# Patient Record
Sex: MALE | Race: WHITE | Employment: FULL TIME | URBAN - METROPOLITAN AREA
[De-identification: names, ages, dates, MRNs, and addresses within clinical notes are randomized per-mention and may not be internally consistent; named-entity substitution may affect disease eponyms.]

---
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
## 2020-03-05 ENCOUNTER — APPOINTMENT (OUTPATIENT)
Dept: GENERAL RADIOLOGY | Age: 20
End: 2020-03-05
Attending: EMERGENCY MEDICINE
Payer: OTHER GOVERNMENT

## 2020-03-05 ENCOUNTER — HOSPITAL ENCOUNTER (EMERGENCY)
Age: 20
Discharge: HOME | End: 2020-03-05
Attending: EMERGENCY MEDICINE
Payer: OTHER GOVERNMENT

## 2020-03-05 VITALS
TEMPERATURE: 97.9 F | HEIGHT: 72 IN | RESPIRATION RATE: 18 BRPM | OXYGEN SATURATION: 97 % | WEIGHT: 150 LBS | HEART RATE: 77 BPM | DIASTOLIC BLOOD PRESSURE: 64 MMHG | SYSTOLIC BLOOD PRESSURE: 129 MMHG | BODY MASS INDEX: 20.32 KG/M2

## 2020-03-05 DIAGNOSIS — T14.91XA SUICIDAL BEHAVIOR WITH ATTEMPTED SELF-INJURY (HCC): ICD-10-CM

## 2020-03-05 DIAGNOSIS — T78.2XXA ANAPHYLAXIS, INITIAL ENCOUNTER: Primary | ICD-10-CM

## 2020-03-05 LAB
ALBUMIN SERPL-MCNC: 3.9 G/DL (ref 3.4–5)
ALBUMIN/GLOB SERPL: 1.6 {RATIO} (ref 0.8–1.7)
ALP SERPL-CCNC: 80 U/L (ref 45–117)
ALT SERPL-CCNC: 37 U/L (ref 16–61)
AMPHET UR QL SCN: NEGATIVE
ANION GAP SERPL CALC-SCNC: 5 MMOL/L (ref 3–18)
AST SERPL-CCNC: 19 U/L (ref 10–38)
BARBITURATES UR QL SCN: NEGATIVE
BASOPHILS # BLD: 0 K/UL (ref 0–0.06)
BASOPHILS NFR BLD: 0 % (ref 0–3)
BENZODIAZ UR QL: NEGATIVE
BILIRUB SERPL-MCNC: 0.6 MG/DL (ref 0.2–1)
BUN SERPL-MCNC: 14 MG/DL (ref 7–18)
BUN/CREAT SERPL: 15 (ref 12–20)
CALCIUM SERPL-MCNC: 8.3 MG/DL (ref 8.5–10.1)
CANNABINOIDS UR QL SCN: POSITIVE
CHLORIDE SERPL-SCNC: 108 MMOL/L (ref 100–111)
CO2 SERPL-SCNC: 29 MMOL/L (ref 21–32)
COCAINE UR QL SCN: NEGATIVE
CREAT SERPL-MCNC: 0.96 MG/DL (ref 0.6–1.3)
DIFFERENTIAL METHOD BLD: ABNORMAL
EOSINOPHIL # BLD: 0 K/UL (ref 0–0.4)
EOSINOPHIL NFR BLD: 0 % (ref 0–5)
ERYTHROCYTE [DISTWIDTH] IN BLOOD BY AUTOMATED COUNT: 12.6 % (ref 11.6–14.5)
ETHANOL SERPL-MCNC: <3 MG/DL (ref 0–3)
GLOBULIN SER CALC-MCNC: 2.4 G/DL (ref 2–4)
GLUCOSE SERPL-MCNC: 153 MG/DL (ref 74–99)
HCT VFR BLD AUTO: 44.3 % (ref 36–48)
HDSCOM,HDSCOM: ABNORMAL
HGB BLD-MCNC: 15.1 G/DL (ref 13–16)
LYMPHOCYTES # BLD: 4.2 K/UL (ref 0.8–3.5)
LYMPHOCYTES NFR BLD: 17 % (ref 20–51)
MCH RBC QN AUTO: 28.8 PG (ref 24–34)
MCHC RBC AUTO-ENTMCNC: 34.1 G/DL (ref 31–37)
MCV RBC AUTO: 84.5 FL (ref 74–97)
METHADONE UR QL: NEGATIVE
MONOCYTES # BLD: 0.2 K/UL (ref 0–1)
MONOCYTES NFR BLD: 1 % (ref 2–9)
NEUTS SEG # BLD: 20.1 K/UL (ref 1.8–8)
NEUTS SEG NFR BLD: 82 % (ref 42–75)
OPIATES UR QL: NEGATIVE
PCP UR QL: NEGATIVE
PLATELET # BLD AUTO: 340 K/UL (ref 135–420)
PLATELET COMMENTS,PCOM: ABNORMAL
PMV BLD AUTO: 9.9 FL (ref 9.2–11.8)
POTASSIUM SERPL-SCNC: 3.2 MMOL/L (ref 3.5–5.5)
PROT SERPL-MCNC: 6.3 G/DL (ref 6.4–8.2)
RBC # BLD AUTO: 5.24 M/UL (ref 4.7–5.5)
RBC MORPH BLD: ABNORMAL
SODIUM SERPL-SCNC: 142 MMOL/L (ref 136–145)
WBC # BLD AUTO: 24.5 K/UL (ref 4.6–13.2)

## 2020-03-05 PROCEDURE — 85025 COMPLETE CBC W/AUTO DIFF WBC: CPT

## 2020-03-05 PROCEDURE — 96374 THER/PROPH/DIAG INJ IV PUSH: CPT

## 2020-03-05 PROCEDURE — 80053 COMPREHEN METABOLIC PANEL: CPT

## 2020-03-05 PROCEDURE — 74011250636 HC RX REV CODE- 250/636: Performed by: EMERGENCY MEDICINE

## 2020-03-05 PROCEDURE — 80307 DRUG TEST PRSMV CHEM ANLYZR: CPT

## 2020-03-05 PROCEDURE — 71045 X-RAY EXAM CHEST 1 VIEW: CPT

## 2020-03-05 PROCEDURE — 74011250636 HC RX REV CODE- 250/636

## 2020-03-05 PROCEDURE — 99285 EMERGENCY DEPT VISIT HI MDM: CPT

## 2020-03-05 PROCEDURE — 96375 TX/PRO/DX INJ NEW DRUG ADDON: CPT

## 2020-03-05 RX ORDER — PREDNISONE 50 MG/1
50 TABLET ORAL DAILY
Qty: 3 TAB | Refills: 0 | Status: SHIPPED | OUTPATIENT
Start: 2020-03-05 | End: 2020-03-08

## 2020-03-05 RX ORDER — DIPHENHYDRAMINE HYDROCHLORIDE 50 MG/ML
50 INJECTION, SOLUTION INTRAMUSCULAR; INTRAVENOUS ONCE
Status: COMPLETED | OUTPATIENT
Start: 2020-03-05 | End: 2020-03-05

## 2020-03-05 RX ORDER — EPINEPHRINE 1 MG/ML
0.3 INJECTION, SOLUTION, CONCENTRATE INTRAVENOUS ONCE
Status: COMPLETED | OUTPATIENT
Start: 2020-03-05 | End: 2020-03-05

## 2020-03-05 RX ORDER — FAMOTIDINE 10 MG/ML
20 INJECTION INTRAVENOUS
Status: COMPLETED | OUTPATIENT
Start: 2020-03-05 | End: 2020-03-05

## 2020-03-05 RX ORDER — DIPHENHYDRAMINE HYDROCHLORIDE 50 MG/ML
INJECTION, SOLUTION INTRAMUSCULAR; INTRAVENOUS
Status: COMPLETED
Start: 2020-03-05 | End: 2020-03-05

## 2020-03-05 RX ORDER — EPINEPHRINE 0.3 MG/.3ML
0.3 INJECTION SUBCUTANEOUS
Qty: 1 SYRINGE | Refills: 5 | Status: SHIPPED | OUTPATIENT
Start: 2020-03-05 | End: 2020-03-05

## 2020-03-05 RX ORDER — EPINEPHRINE 1 MG/ML
INJECTION, SOLUTION, CONCENTRATE INTRAVENOUS
Status: COMPLETED
Start: 2020-03-05 | End: 2020-03-05

## 2020-03-05 RX ORDER — ONDANSETRON 2 MG/ML
INJECTION INTRAMUSCULAR; INTRAVENOUS
Status: COMPLETED
Start: 2020-03-05 | End: 2020-03-05

## 2020-03-05 RX ORDER — ONDANSETRON 2 MG/ML
4 INJECTION INTRAMUSCULAR; INTRAVENOUS
Status: COMPLETED | OUTPATIENT
Start: 2020-03-05 | End: 2020-03-05

## 2020-03-05 RX ADMIN — ONDANSETRON 4 MG: 2 INJECTION INTRAMUSCULAR; INTRAVENOUS at 06:58

## 2020-03-05 RX ADMIN — EPINEPHRINE 0.3 MG: 1 INJECTION, SOLUTION, CONCENTRATE INTRAVENOUS at 07:00

## 2020-03-05 RX ADMIN — DIPHENHYDRAMINE HYDROCHLORIDE 50 MG: 50 INJECTION, SOLUTION INTRAMUSCULAR; INTRAVENOUS at 06:58

## 2020-03-05 RX ADMIN — EPINEPHRINE 0.3 MG: 1 INJECTION, SOLUTION, CONCENTRATE INTRAVENOUS at 06:59

## 2020-03-05 RX ADMIN — FAMOTIDINE 20 MG: 10 INJECTION, SOLUTION INTRAVENOUS at 06:59

## 2020-03-05 NOTE — BSMART NOTE
Comprehensive Assessment Form Integrated Summary This 22 y/o patient presented to ED with mother at bedside for c/o anaphylactic reaction. Patient has a history of allergy to milk and any food containing milk products. Patient presents A/O X4, tearful at times. On this current assessment, patient is denying any suicidal/homicidal ideation, nor auditory/visual hallucinations. Patient is very tearful as he discusses his current living situation and stressors with father going through and breaking my shit. When talking about his father, patient had facial grimaces and made fists stating I want to get that mutha F%%%er. Mom interjected that patients father goes through patients belongings because of patients multi drug use. Per mom, patient is being tested from home daily for drugs. Patient is very guarded and refuses to be forthcoming with answers to assessment questions. When asked why patient would consume foods that he is allergic to, patient stated I dont know I just wanted to taste it. Patient endorsed that he knew what the reaction would be if he consumed the dairy products, but denied intentionally doing it to try to harm self. Mom was at bedside and patient gave permission for mom to stay in room and to explain what lead patient to ED today. As per mom, patient has been doing drugs for the past year and when he doesnt get the drugs or when patient is coming off of a high, patient becomes very aggressive. Mom states that patient breaks things and punches things. Mom states Im afraid to be home alone with him. Substance Abuse UDS = + marijuana, BAL= <3 Disposition Discussed with Dr. Yenny Chavez, patient to be referred to [facility] for TDO assessment.  
  
Fifi Mcduffie RN

## 2020-03-05 NOTE — ED TRIAGE NOTES
Patient presents to the ED via EMS from home for evaluation of anaphylaxis. Per medic, \"He has a dairy allergy and ate a slice of pizza. When he started having trouble breathing he woke his mom up who administered his epipen then she called 911. When we arrived on scene he was covered in hives, diaphoretic and having trouble breathing. We placed a line, administered 50mg of benadryl, 125mg of solu-medrol and hung a 1L NS. \"    Mother states, \"He has been trying to kill himself. Last week he ate ice cream in an attempt. Last night he was withdrawing from xanax. He was acting crazy and then ate pizza in an attempt to kill himself again. \"

## 2020-03-05 NOTE — ED PROVIDER NOTES
EMERGENCY DEPARTMENT HISTORY AND PHYSICAL EXAM    6:27 AM  Date: 3/5/2020  Patient Name: Reynaldo Brand    History of Presenting Illness     No chief complaint on file. History Provided By: Patient and Patient's Mother    HPI: Reynaldo Brand is a 23 y.o. male with history of multi drug abuse. Patient was brought in by ambulance after anaphylactic reaction. He had a piece of pizza then broke out in hives. Then he woke his mom up who administered the EpiPen. Mom reported that he had raspy voice and was having difficulty breathing he also vomited multiple times. When EMS got there they gave him Solu-Medrol and nebs because he was having difficulty breathing and started him on fluids as well as Benadryl. According to the mother the patient did this on purpose trying to kill himself. 2 weeks ago he had a pint of ice cream attempting to kill himself but he refused to go to the hospital and his mother could not get him and he took Benadryl at that time. He is addicted to [facility] and according to his mom he was coming off of his Xanax last night and was agitated and banging on the walls and then this morning he ate the pizza. Patient admits to being suicidal.    Location:  Severity:  Timing/course: Onset/Duration:     PCP: Lucrecia Foy MD    Past History     Past Medical History:  No past medical history on file. Past Surgical History:  No past surgical history on file. Family History:  Family History   Family history unknown: Yes       Social History:  Social History     Tobacco Use    Smoking status: Never Smoker   Substance Use Topics    Alcohol use: No    Drug use: No       Allergies: Allergies   Allergen Reactions    Milk Containing Products Anaphylaxis     ANYTHING DAIRY OR DAIRY CONTAINING    Beef Containing Products Nausea and Vomiting       Review of Systems   Review of Systems   Respiratory: Positive for shortness of breath. Gastrointestinal: Positive for vomiting.    Skin: Positive for rash. Psychiatric/Behavioral: Positive for agitation, behavioral problems, self-injury and suicidal ideas. All other systems reviewed and are negative. Physical Exam     No data found. Physical Exam  Vitals signs and nursing note reviewed. Constitutional:       Comments: Diffusely erythematous skin. Anxious appearing   HENT:      Head: Normocephalic and atraumatic. Nose: Nose normal.      Mouth/Throat:      Mouth: Mucous membranes are moist.      Pharynx: Oropharynx is clear. Eyes:      Extraocular Movements: Extraocular movements intact. Neck:      Musculoskeletal: Neck supple. Cardiovascular:      Rate and Rhythm: Tachycardia present. Pulmonary:      Effort: Pulmonary effort is normal.      Breath sounds: Normal breath sounds. Musculoskeletal: Normal range of motion. General: No deformity. Skin:     General: Skin is warm. Findings: Erythema present. No rash. Neurological:      General: No focal deficit present. Mental Status: He is alert and oriented to person, place, and time. Psychiatric:         Attention and Perception: Attention normal.         Mood and Affect: Mood is depressed. Affect is flat. Speech: Speech is not delayed. Behavior: Behavior is slowed and withdrawn. Thought Content: Thought content includes suicidal ideation. Diagnostic Study Results     Labs -  No results found for this or any previous visit (from the past 12 hour(s)). Radiologic Studies -   No results found. Medical Decision Making     ED Course: Progress Notes, Reevaluation, and Consults:    6:27 AM Initial assessment performed. The patients presenting problems have been discussed, and they/their family are in agreement with the care plan formulated and outlined with them. I have encouraged them to ask questions as they arise throughout their visit.     6:40 AM  Patient started having more redness of his skin in his been itching and also has swelling of his fingers now so I ordered him a repeat dose of Benadryl and epi. Provider Notes (Medical Decision Making): 70-year-old male brought in by ambulance after anaphylactic reaction. He ate pizza which she is allergic to dairy products and attempting to kill himself. Patient is well-appearing on exam, skin is generally erythematous but no wheals. Airways patent and his lungs are clear. Will continue IV fluids and give him some IV Pepcid. Psych screening labs and crisis evaluation. Procedures:     Critical Care Time:     Vital Signs-Reviewed the patient's vital signs. Reviewed pt's pulse ox reading. EKG: Interpreted by the EP. Time Interpreted:    Rate:    Rhythm:    Interpretation:   Comparison:     Records Reviewed: Nursing Notes (Time of Review: 6:27 AM)  -I am the first provider for this patient.  -I reviewed the vital signs, available nursing notes, past medical history, past surgical history, family history and social history. Current Facility-Administered Medications   Medication Dose Route Frequency Provider Last Rate Last Dose    famotidine (PF) (PEPCID) injection 20 mg  20 mg IntraVENous NOW Edenilson Contreras MD         Current Outpatient Medications   Medication Sig Dispense Refill    ibuprofen (ADVIL) 200 mg tablet Take  by mouth. Clinical Impression     Clinical Impression: No diagnosis found. Disposition: This note was dictated utilizing voice recognition software which may lead to typographical errors. I apologize in advance if the situation occurs. If questions arise please do not hesitate to contact me or call our department.     Marion Rivas MD  6:27 AM

## 2020-03-05 NOTE — ED NOTES
Provider as well as myself have reviewed discharge instructions with the patient and parent. The patient and parent verbalized understanding.

## 2020-03-05 NOTE — ED NOTES
7 AM patient turned over to me Dr. Tulio Melendez this is a 40-year-old gentleman with a history of drug abuse brought in for anaphylaxis. Apparently he has a history of allergy to dairy and ate some pizza in an attempt to harm himself. He received epinephrine Solu-Medrol and nitro prehospital.  He has been given epinephrine IM 0.3 mg x 2 here. He is presently awake and alert with mild diffuse erythema otherwise no symptoms. We will continue with observation here and once he is deemed stable have crisis evaluate    8 AM and 9 AM evaluations with no change patient resting comfortably. Will order breakfast today and have mom look over there to make sure there is no allergies    Patient observed till about noon he was resting comfortably without complaint. He was seen by crisis who stated patient refusing admission. We will have [facility] evaluate    Seen by [facility]. They have a plan for outpatient follow-up the family is in agreement. I think it is reasonable. I confirm with the parents they are both okay with this plan so I will agree with it.    5:13 PM allergic reaction completely resolved .  Will rx epi pens and predx3 days

## 2023-10-12 ENCOUNTER — TELEPHONE (OUTPATIENT)
Age: 23
End: 2023-10-12

## 2023-10-12 NOTE — TELEPHONE ENCOUNTER
Pt mom Sharron Brunner called asking if Dr Aguayo [facility] [facility] .  Pt has a new pt appt on 10/23

## 2023-10-20 ENCOUNTER — TELEPHONE (OUTPATIENT)
Age: 23
End: 2023-10-20

## 2023-10-20 NOTE — TELEPHONE ENCOUNTER
We will fax his pediatrics office verification that we received his records.      Dr. Matthew Smith  [facility] [address] [place] [address]  Phone: [phone]  Fax: [phone]

## 2023-10-23 ENCOUNTER — OFFICE VISIT (OUTPATIENT)
Age: 23
End: 2023-10-23
Payer: OTHER GOVERNMENT

## 2023-10-23 VITALS
RESPIRATION RATE: 16 BRPM | SYSTOLIC BLOOD PRESSURE: 126 MMHG | BODY MASS INDEX: 22.6 KG/M2 | DIASTOLIC BLOOD PRESSURE: 69 MMHG | WEIGHT: 161.4 LBS | OXYGEN SATURATION: 97 % | HEART RATE: 76 BPM | HEIGHT: 71 IN | TEMPERATURE: 98.6 F

## 2023-10-23 DIAGNOSIS — J45.20 MILD INTERMITTENT ASTHMA WITHOUT COMPLICATION: Primary | ICD-10-CM

## 2023-10-23 DIAGNOSIS — K20.0 EOSINOPHILIC ESOPHAGITIS: ICD-10-CM

## 2023-10-23 DIAGNOSIS — Z11.3 SCREENING EXAMINATION FOR STD (SEXUALLY TRANSMITTED DISEASE): ICD-10-CM

## 2023-10-23 DIAGNOSIS — Z13.220 SCREENING FOR HYPERLIPIDEMIA: ICD-10-CM

## 2023-10-23 PROCEDURE — 99204 OFFICE O/P NEW MOD 45 MIN: CPT | Performed by: INTERNAL MEDICINE

## 2023-10-23 SDOH — ECONOMIC STABILITY: INCOME INSECURITY: HOW HARD IS IT FOR YOU TO PAY FOR THE VERY BASICS LIKE FOOD, HOUSING, MEDICAL CARE, AND HEATING?: NOT HARD AT ALL

## 2023-10-23 SDOH — ECONOMIC STABILITY: HOUSING INSECURITY
IN THE LAST 12 MONTHS, WAS THERE A TIME WHEN YOU DID NOT HAVE A STEADY PLACE TO SLEEP OR SLEPT IN A SHELTER (INCLUDING NOW)?: NO

## 2023-10-23 SDOH — ECONOMIC STABILITY: FOOD INSECURITY: WITHIN THE PAST 12 MONTHS, THE FOOD YOU BOUGHT JUST DIDN'T LAST AND YOU DIDN'T HAVE MONEY TO GET MORE.: NEVER TRUE

## 2023-10-23 SDOH — ECONOMIC STABILITY: FOOD INSECURITY: WITHIN THE PAST 12 MONTHS, YOU WORRIED THAT YOUR FOOD WOULD RUN OUT BEFORE YOU GOT MONEY TO BUY MORE.: NEVER TRUE

## 2023-10-23 ASSESSMENT — PATIENT HEALTH QUESTIONNAIRE - PHQ9
1. LITTLE INTEREST OR PLEASURE IN DOING THINGS: 0
2. FEELING DOWN, DEPRESSED OR HOPELESS: 0
SUM OF ALL RESPONSES TO PHQ QUESTIONS 1-9: 0
SUM OF ALL RESPONSES TO PHQ9 QUESTIONS 1 & 2: 0

## 2023-10-23 NOTE — PROGRESS NOTES
[facility]:  10/29/2023, MRN: [id number]      Raghavendra Jaeger is a 21 y.o. male and presents to clinic for New Patient and Established New Doctor      Subjective: The pt is a 21yo male with a h/o allergic rhinitis, eosinophilic esophagitis, and asthma. 1. Asthma: \"I feel like I'm breathing fine at the moment. \" +Never been hospitalized for this. No inhaler rx. No SOB/cough. 2. Eosinophilic Esophagitis: Asymptomatic. Previous pediatrician records were reviewed prior to his apt.     3. Health Maintenance:  - Tobacco use: He vaped/smoked at some point in the past.  - Drug use: He does not answer clearly about his present use of recreational drugs.   - ETOH Use: sometimes. When asked about how much, he will not answer with an amt  - Diet: no restrictions. - He is ok with STD screening but won't discuss if he has had sex  - Depression: He does not endorse or deny depression sx. \"I don't know. \"        There are no problems to display for this patient. Current Outpatient Medications   Medication Sig Dispense Refill    ibuprofen (ADVIL;MOTRIN) 200 MG tablet Take by mouth       No current facility-administered medications for this visit. Allergies   Allergen Reactions    Milk (Cow) Anaphylaxis     ANYTHING DAIRY OR DAIRY CONTAINING       Past Medical History:   Diagnosis Date    Asthma        Past Surgical History:   Procedure Laterality Date    CIRCUMCISION      WISDOM TOOTH EXTRACTION         No family history on file. Social History     Tobacco Use    Smoking status: Never    Smokeless tobacco: Not on file   Substance Use Topics    Alcohol use: No       ROS   Review of Systems   Constitutional:  Negative for fever. HENT:  Negative for ear pain and sore throat. Eyes:  Negative for pain and visual disturbance. Respiratory:  Negative for cough and shortness of breath. Cardiovascular:  Negative for chest pain.    Gastrointestinal:  Negative for abdominal pain, anal bleeding and blood in

## 2023-10-24 ENCOUNTER — TELEPHONE (OUTPATIENT)
Age: 23
End: 2023-10-24

## 2023-10-24 NOTE — TELEPHONE ENCOUNTER
Form from [facility] to verify if we received patient's medical records from them has been signed and faxed back on 10/23/23. Confirmation received and scanned.

## 2023-10-29 PROBLEM — J45.20 MILD INTERMITTENT ASTHMA WITHOUT COMPLICATION: Status: ACTIVE | Noted: 2023-10-29

## 2023-10-29 PROBLEM — K20.0 EOSINOPHILIC ESOPHAGITIS: Status: ACTIVE | Noted: 2023-10-29

## 2023-10-29 ASSESSMENT — ENCOUNTER SYMPTOMS
SHORTNESS OF BREATH: 0
ABDOMINAL PAIN: 0
ANAL BLEEDING: 0
BLOOD IN STOOL: 0
EYE PAIN: 0
COUGH: 0
SORE THROAT: 0

## 2023-12-11 ENCOUNTER — HOSPITAL ENCOUNTER (OUTPATIENT)
Facility: HOSPITAL | Age: 23
Setting detail: SPECIMEN
Discharge: HOME | End: 2023-12-14
Payer: MEDICAID

## 2023-12-11 DIAGNOSIS — J45.20 MILD INTERMITTENT ASTHMA WITHOUT COMPLICATION: ICD-10-CM

## 2023-12-11 DIAGNOSIS — Z11.3 SCREENING EXAMINATION FOR STD (SEXUALLY TRANSMITTED DISEASE): ICD-10-CM

## 2023-12-11 DIAGNOSIS — K20.0 EOSINOPHILIC ESOPHAGITIS: ICD-10-CM

## 2023-12-11 DIAGNOSIS — Z13.220 SCREENING FOR HYPERLIPIDEMIA: ICD-10-CM

## 2023-12-11 LAB
ALBUMIN SERPL-MCNC: 4.4 G/DL (ref 3.4–5)
ALBUMIN/GLOB SERPL: 1.9 (ref 0.8–1.7)
ALP SERPL-CCNC: 89 U/L (ref 45–117)
ALT SERPL-CCNC: 19 U/L (ref 16–61)
ANION GAP SERPL CALC-SCNC: 1 MMOL/L (ref 3–18)
AST SERPL-CCNC: 15 U/L (ref 10–38)
BASOPHILS # BLD: 0.1 K/UL (ref 0–0.1)
BASOPHILS NFR BLD: 1 % (ref 0–2)
BILIRUB SERPL-MCNC: 0.4 MG/DL (ref 0.2–1)
BUN SERPL-MCNC: 11 MG/DL (ref 7–18)
BUN/CREAT SERPL: 12 (ref 12–20)
CALCIUM SERPL-MCNC: 9.5 MG/DL (ref 8.5–10.1)
CHLORIDE SERPL-SCNC: 107 MMOL/L (ref 100–111)
CHOLEST SERPL-MCNC: 109 MG/DL
CO2 SERPL-SCNC: 33 MMOL/L (ref 21–32)
CREAT SERPL-MCNC: 0.91 MG/DL (ref 0.6–1.3)
DIFFERENTIAL METHOD BLD: ABNORMAL
EOSINOPHIL # BLD: 0.5 K/UL (ref 0–0.4)
EOSINOPHIL NFR BLD: 9 % (ref 0–5)
ERYTHROCYTE [DISTWIDTH] IN BLOOD BY AUTOMATED COUNT: 11.9 % (ref 11.6–14.5)
GLOBULIN SER CALC-MCNC: 2.3 G/DL (ref 2–4)
GLUCOSE SERPL-MCNC: 95 MG/DL (ref 74–99)
HCT VFR BLD AUTO: 48.7 % (ref 36–48)
HDLC SERPL-MCNC: 44 MG/DL (ref 40–60)
HDLC SERPL: 2.5 (ref 0–5)
HGB BLD-MCNC: 15.9 G/DL (ref 13–16)
IMM GRANULOCYTES # BLD AUTO: 0 K/UL (ref 0–0.04)
IMM GRANULOCYTES NFR BLD AUTO: 0 % (ref 0–0.5)
LDLC SERPL CALC-MCNC: 53.6 MG/DL (ref 0–100)
LIPID PANEL: NORMAL
LYMPHOCYTES # BLD: 1.3 K/UL (ref 0.9–3.6)
LYMPHOCYTES NFR BLD: 24 % (ref 21–52)
MCH RBC QN AUTO: 28 PG (ref 24–34)
MCHC RBC AUTO-ENTMCNC: 32.6 G/DL (ref 31–37)
MCV RBC AUTO: 85.7 FL (ref 78–100)
MONOCYTES # BLD: 0.3 K/UL (ref 0.05–1.2)
MONOCYTES NFR BLD: 6 % (ref 3–10)
NEUTS SEG # BLD: 3.3 K/UL (ref 1.8–8)
NEUTS SEG NFR BLD: 60 % (ref 40–73)
NRBC # BLD: 0 K/UL (ref 0–0.01)
NRBC BLD-RTO: 0 PER 100 WBC
PLATELET # BLD AUTO: 257 K/UL (ref 135–420)
PMV BLD AUTO: 11.2 FL (ref 9.2–11.8)
POTASSIUM SERPL-SCNC: 4.3 MMOL/L (ref 3.5–5.5)
PROT SERPL-MCNC: 6.7 G/DL (ref 6.4–8.2)
RBC # BLD AUTO: 5.68 M/UL (ref 4.35–5.65)
SODIUM SERPL-SCNC: 141 MMOL/L (ref 136–145)
T PALLIDUM AB SER QL IA: NONREACTIVE
TRIGL SERPL-MCNC: 57 MG/DL
VLDLC SERPL CALC-MCNC: 11.4 MG/DL
WBC # BLD AUTO: 5.4 K/UL (ref 4.6–13.2)

## 2023-12-11 PROCEDURE — 80061 LIPID PANEL: CPT

## 2023-12-11 PROCEDURE — 87661 TRICHOMONAS VAGINALIS AMPLIF: CPT

## 2023-12-11 PROCEDURE — 87591 N.GONORRHOEAE DNA AMP PROB: CPT

## 2023-12-11 PROCEDURE — 87340 HEPATITIS B SURFACE AG IA: CPT

## 2023-12-11 PROCEDURE — 85025 COMPLETE CBC W/AUTO DIFF WBC: CPT

## 2023-12-11 PROCEDURE — 80053 COMPREHEN METABOLIC PANEL: CPT

## 2023-12-11 PROCEDURE — 87491 CHLMYD TRACH DNA AMP PROBE: CPT

## 2023-12-11 PROCEDURE — 36415 COLL VENOUS BLD VENIPUNCTURE: CPT

## 2023-12-11 PROCEDURE — 87389 HIV-1 AG W/HIV-1&-2 AB AG IA: CPT

## 2023-12-11 PROCEDURE — 86780 TREPONEMA PALLIDUM: CPT

## 2023-12-11 PROCEDURE — 86803 HEPATITIS C AB TEST: CPT

## 2023-12-12 LAB
C TRACH RRNA SPEC QL NAA+PROBE: NEGATIVE
HBV SURFACE AG SER QL: <0.1 INDEX
HBV SURFACE AG SER QL: NEGATIVE
HCV AB SER IA-ACNC: 0.06 INDEX
HCV AB SERPL QL IA: NEGATIVE
HEPATITIS C COMMENT: NORMAL
HIV 1+2 AB+HIV1 P24 AG SERPL QL IA: NONREACTIVE
HIV 1/2 RESULT COMMENT: NORMAL
N GONORRHOEA RRNA SPEC QL NAA+PROBE: NEGATIVE
SPECIMEN SOURCE: NORMAL
T VAGINALIS RRNA SPEC QL NAA+PROBE: NEGATIVE

## 2023-12-28 ENCOUNTER — TELEPHONE (OUTPATIENT)
Age: 23
End: 2023-12-28

## 2023-12-28 NOTE — TELEPHONE ENCOUNTER
----- Message from Chase Vegas sent at 12/28/2023 12:19 PM EST -----  Subject: Message to Provider    QUESTIONS  Information for Provider? Patient sees Melanie Copa and [employer]   which is [employer] shows that Melanie Copa is not a covered   Provider. Insurance states that she needs to update her liscence. Please   call [phone]. Patient and mom just want to be sure that all   appointments are covered. ---------------------------------------------------------------------------  --------------  Isabelle Robin [facility]  [phone]; OK to leave message on voicemail  ---------------------------------------------------------------------------  --------------  SCRIPT ANSWERS  Relationship to Patient? Parent  Representative Name? Janna  Patient is under 25 and the Parent has custody? No  Is the representative on the Communication Release of Information (ANURAG)   form in Epic?  Yes

## 2024-01-29 ENCOUNTER — APPOINTMENT (OUTPATIENT)
Facility: HOSPITAL | Age: 24
End: 2024-01-29
Payer: COMMERCIAL

## 2024-01-29 ENCOUNTER — HOSPITAL ENCOUNTER (EMERGENCY)
Facility: HOSPITAL | Age: 24
Discharge: PSYCHIATRIC OR SUBSTANCE USE FACILITY | End: 2024-01-30
Attending: EMERGENCY MEDICINE
Payer: COMMERCIAL

## 2024-01-29 DIAGNOSIS — F29 PSYCHOSIS, UNSPECIFIED PSYCHOSIS TYPE (HCC): Primary | ICD-10-CM

## 2024-01-29 DIAGNOSIS — F19.10 SUBSTANCE ABUSE (HCC): ICD-10-CM

## 2024-01-29 DIAGNOSIS — R40.4 TRANSIENT ALTERATION OF AWARENESS: ICD-10-CM

## 2024-01-29 LAB
ALBUMIN SERPL-MCNC: 4.4 G/DL (ref 3.4–5)
ALBUMIN/GLOB SERPL: 1.6 (ref 0.8–1.7)
ALP SERPL-CCNC: 72 U/L (ref 45–117)
ALT SERPL-CCNC: 35 U/L (ref 16–61)
AMPHET UR QL SCN: NEGATIVE
ANION GAP SERPL CALC-SCNC: 4 MMOL/L (ref 3–18)
AST SERPL-CCNC: 24 U/L (ref 10–38)
BARBITURATES UR QL SCN: NEGATIVE
BASOPHILS # BLD: 0.1 K/UL (ref 0–0.1)
BASOPHILS NFR BLD: 1 % (ref 0–2)
BENZODIAZ UR QL: NEGATIVE
BILIRUB SERPL-MCNC: 1 MG/DL (ref 0.2–1)
BUN SERPL-MCNC: 11 MG/DL (ref 7–18)
BUN/CREAT SERPL: 10 (ref 12–20)
CALCIUM SERPL-MCNC: 9.3 MG/DL (ref 8.5–10.1)
CANNABINOIDS UR QL SCN: NEGATIVE
CHLORIDE SERPL-SCNC: 108 MMOL/L (ref 100–111)
CO2 SERPL-SCNC: 27 MMOL/L (ref 21–32)
COCAINE UR QL SCN: NEGATIVE
CREAT SERPL-MCNC: 1.07 MG/DL (ref 0.6–1.3)
DIFFERENTIAL METHOD BLD: NORMAL
EKG ATRIAL RATE: 77 BPM
EKG DIAGNOSIS: NORMAL
EKG P AXIS: 49 DEGREES
EKG P-R INTERVAL: 128 MS
EKG Q-T INTERVAL: 368 MS
EKG QRS DURATION: 94 MS
EKG QTC CALCULATION (BAZETT): 416 MS
EKG R AXIS: 79 DEGREES
EKG T AXIS: 36 DEGREES
EKG VENTRICULAR RATE: 77 BPM
EOSINOPHIL # BLD: 0.1 K/UL (ref 0–0.4)
EOSINOPHIL NFR BLD: 1 % (ref 0–5)
ERYTHROCYTE [DISTWIDTH] IN BLOOD BY AUTOMATED COUNT: 11.9 % (ref 11.6–14.5)
ETHANOL SERPL-MCNC: <3 MG/DL (ref 0–3)
FLUAV RNA SPEC QL NAA+PROBE: NOT DETECTED
FLUBV RNA SPEC QL NAA+PROBE: NOT DETECTED
GLOBULIN SER CALC-MCNC: 2.7 G/DL (ref 2–4)
GLUCOSE SERPL-MCNC: 94 MG/DL (ref 74–99)
HCT VFR BLD AUTO: 44.3 % (ref 36–48)
HGB BLD-MCNC: 15.1 G/DL (ref 13–16)
IMM GRANULOCYTES # BLD AUTO: 0 K/UL (ref 0–0.04)
IMM GRANULOCYTES NFR BLD AUTO: 0 % (ref 0–0.5)
LYMPHOCYTES # BLD: 1.8 K/UL (ref 0.9–3.6)
LYMPHOCYTES NFR BLD: 24 % (ref 21–52)
Lab: NORMAL
MAGNESIUM SERPL-MCNC: 2 MG/DL (ref 1.6–2.6)
MCH RBC QN AUTO: 28.5 PG (ref 24–34)
MCHC RBC AUTO-ENTMCNC: 34.1 G/DL (ref 31–37)
MCV RBC AUTO: 83.6 FL (ref 78–100)
METHADONE UR QL: NEGATIVE
MONOCYTES # BLD: 0.6 K/UL (ref 0.05–1.2)
MONOCYTES NFR BLD: 8 % (ref 3–10)
NEUTS SEG # BLD: 4.9 K/UL (ref 1.8–8)
NEUTS SEG NFR BLD: 65 % (ref 40–73)
NRBC # BLD: 0 K/UL (ref 0–0.01)
NRBC BLD-RTO: 0 PER 100 WBC
OPIATES UR QL: NEGATIVE
PCP UR QL: NEGATIVE
PLATELET # BLD AUTO: 251 K/UL (ref 135–420)
PMV BLD AUTO: 10.1 FL (ref 9.2–11.8)
POTASSIUM SERPL-SCNC: 3.5 MMOL/L (ref 3.5–5.5)
PROT SERPL-MCNC: 7.1 G/DL (ref 6.4–8.2)
RBC # BLD AUTO: 5.3 M/UL (ref 4.35–5.65)
SARS-COV-2 RNA RESP QL NAA+PROBE: NOT DETECTED
SODIUM SERPL-SCNC: 139 MMOL/L (ref 136–145)
WBC # BLD AUTO: 7.5 K/UL (ref 4.6–13.2)

## 2024-01-29 PROCEDURE — 83735 ASSAY OF MAGNESIUM: CPT

## 2024-01-29 PROCEDURE — 99285 EMERGENCY DEPT VISIT HI MDM: CPT

## 2024-01-29 PROCEDURE — 71045 X-RAY EXAM CHEST 1 VIEW: CPT

## 2024-01-29 PROCEDURE — 6370000000 HC RX 637 (ALT 250 FOR IP): Performed by: PHYSICIAN ASSISTANT

## 2024-01-29 PROCEDURE — 87636 SARSCOV2 & INF A&B AMP PRB: CPT

## 2024-01-29 PROCEDURE — 70450 CT HEAD/BRAIN W/O DYE: CPT

## 2024-01-29 PROCEDURE — 80053 COMPREHEN METABOLIC PANEL: CPT

## 2024-01-29 PROCEDURE — 93010 ELECTROCARDIOGRAM REPORT: CPT | Performed by: INTERNAL MEDICINE

## 2024-01-29 PROCEDURE — 85025 COMPLETE CBC W/AUTO DIFF WBC: CPT

## 2024-01-29 PROCEDURE — 82077 ASSAY SPEC XCP UR&BREATH IA: CPT

## 2024-01-29 PROCEDURE — 80307 DRUG TEST PRSMV CHEM ANLYZR: CPT

## 2024-01-29 PROCEDURE — 93005 ELECTROCARDIOGRAM TRACING: CPT | Performed by: EMERGENCY MEDICINE

## 2024-01-29 RX ORDER — LORAZEPAM 1 MG/1
1 TABLET ORAL
Status: COMPLETED | OUTPATIENT
Start: 2024-01-29 | End: 2024-01-29

## 2024-01-29 RX ADMIN — LORAZEPAM 1 MG: 1 TABLET ORAL at 17:12

## 2024-01-29 NOTE — BSMART NOTE
Crisis note:    Daniel of [facility] called to inform that the TDO has been supported and issued by the . TDO should be faxed over to the ER. Daniel states patient is a [place] resident so [place] will need to send over an officer.     1830: Went to the ER to follow up on TDO being faxed. No TDO has been faxed at this time.

## 2024-01-29 NOTE — ED NOTES
Pt awake, speaking softly, requesting ice water. RN provided.   Pt alert to self and year. Unsure place or situation. Pt advised he was at [facility] and he was brought in by EMS due to confusion.     Encouraged to use urinal at bedside. Advised not to get up without assistance as he may be unsteady on his feet, pt verbalized understanding and agreement.

## 2024-01-29 NOTE — ED TRIAGE NOTES
Pt arrived via EMS from home with AMS. Per EMS pt is stating he smoked either Bird or Dog poop. Pt was found in his shower, wet with all his clothes on. Possibly used his epi pen as there were two used ones found in his room.

## 2024-01-29 NOTE — ED NOTES
Pt father at bedside requesting psych to see patient. Pt father states he has had psych concerns for his son that have been progressively getting worse. Pt father states he will not take him home if he is DC due to safety concerns.

## 2024-01-29 NOTE — ED NOTES
Pt father at bedside. Pt provided another cup of ice water, per request.     Pt provided urine sample (700 ml)
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
## 2024-01-29 NOTE — DISCHARGE INSTRUCTIONS
Please avoid the use of illicit substances.  Arrange follow-up with your primary care doctor to discuss supporting this effort.  Return as needed

## 2024-01-29 NOTE — ED NOTES
Father has been at bedside since this morning. He is heading home and plans to return tomorrow. Father, Luis Rios, would like an update if a bed is found. Per demographics, pt's father can be reached at:    [phone]

## 2024-01-29 NOTE — ED NOTES
Pt A+Ox4. Pt brought in by EMS hallucinating. Pt was found in the shower with the clothes on. When pt asked if he is hearing or seeing anything that others can't hear or see, pt responded with \"my senses\". Pt gaze drifts to left side of room. Pt changed out of wet clothes and placed on monitor. Vitals stable at this time.

## 2024-01-29 NOTE — BSMART NOTE
Crisis note:    Dr. Bueno declined to admit patient to [facility] due to current unit acuity.    Daniel Russo of [facility] has seen patient for TDO evaluation. He faxed the petition to the .

## 2024-01-29 NOTE — ED PROVIDER NOTES
[facility] EMERG[facility]Y DEPT  eMERGENCY dEPARTMENT eNCOUnter        Pt Name: Chuy Rios  MRN: [id number]  Birthdate 2000  Date of evaluation: 1/29/2024  Provider: Maxwell Loo MD  PCP: Shital Mabry MD  Note Started: 4:28 AM EST 1/30/2024          CHIEF COMPLAINT       Chief Complaint   Patient presents with    Altered Mental Status       HISTORY OF PRESENT ILLNESS        Patient brought in by EMS who was called by the family for concerns for unusual behaviors.  They report that the patient has a history of polysubstance abuse.  Patient was found in the shower with all of this close on.  He is not willing to tell me what substances he has been using.  No fever.  No cough, chest pain or shortness of breath no abdominal pain, vomiting or diarrhea.  He denies thoughts of suicide or homicide.    Nursing Notes were all reviewed and agreed with or any disagreements were addressed  in the HPI.    REVIEW OF SYSTEMS         The following 10 systems are reviewed and negative except as noted in the HPI: Constitutional, Eyes, ENT, cardiovascular, pulmonary, GI, , neuro, skin, and musculoskeletal       PASTMEDICAL HISTORY     Past Medical History:   Diagnosis Date    Asthma          SURGICAL HISTORY       Past Surgical History:   Procedure Laterality Date    CIRCUMCISION      WISDOM TOOTH EXTRACTION           CURRENT MEDICATIONS       Previous Medications    IBUPROFEN (ADVIL;MOTRIN) 200 MG TABLET    Take by mouth       ALLERGIES     Milk (cow)    FAMILY HISTORY     No family history on file.       SOCIAL HISTORY       Social History     Socioeconomic History    Marital status: Single   Tobacco Use    Smoking status: Never   Vaping Use    Vaping Use: Former    Quit date: 12/1/2022    Passive vaping exposure: Yes   Substance and Sexual Activity    Alcohol use: No    Drug use: No    Sexual activity: Not Currently     Social Determinants of Health     Financial Resource Strain: Low Risk  (10/23/2023)    Overall

## 2024-01-29 NOTE — BSMART NOTE
Crisis note:    Called the [facility] answering service twice asking for the on call to call crisis. No call received from the on call    Daniel Russo of [facility] called crisis asking for a chart on an individual he saw earlier in the ER on an ECO be faxed to him. Asked Daniel if he had received my request for a TDO evaluation on this patient, told him I called the answering service twice and told they were having trouble getting hold of the on call person. Daniel said he did not receive any notifications of my request. Gave Daniel clinical about patient and told him chart has been faxed to the [facility] and patient's Father is in the ER with patient and is the petitioner.
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
## 2024-01-29 NOTE — BSMART NOTE
Crisis note:    Per Cheri on call for the [facility] she is unaware of a bed search being started for this patient.    Asked Cheri to send prescreen to crisis. Prescreen received. This writer called [facility] to check if any beds available. Spoke to Emmanuel who reports there a beds available but currently their lobby has a least 3 patients needing assessed but can fax for review when able.    Chart faxed to [facility] for review by this writer.

## 2024-01-29 NOTE — ED NOTES
6:22 PM Assumed care of the patient at this time. Discussed with VICKI Souza concerning patient Chuy Rios, standard discussion of reason for visit, HPI, ROS, PE, and current results available.  Recommendation for .continuing to monitor the pt while in the ED awaiting placement by [facility].     Olga Dye PA-C      12:30 AM patient is still pending [facility] placement.  He will be turned over to night ED attending Dr. Ceballos who will assume care of the patient following completion of my shift. Olga Dye PA-C     Disposition: TBD     Dictation disclaimer:  Please note that this dictation was completed with [employer], the computer voice recognition software.  Quite often unanticipated grammatical, syntax, homophones, and other interpretive errors are inadvertently transcribed by the computer software.  Please disregard these errors.  Please excuse any errors that have escaped final proofreading.       Olga Dye PA-C  01/30/24 0031

## 2024-01-30 VITALS
WEIGHT: 160 LBS | BODY MASS INDEX: 22.4 KG/M2 | RESPIRATION RATE: 16 BRPM | SYSTOLIC BLOOD PRESSURE: 149 MMHG | TEMPERATURE: 98 F | OXYGEN SATURATION: 100 % | DIASTOLIC BLOOD PRESSURE: 79 MMHG | HEART RATE: 80 BPM | HEIGHT: 71 IN

## 2024-01-30 PROCEDURE — 94761 N-INVAS EAR/PLS OXIMETRY MLT: CPT

## 2024-01-30 PROCEDURE — 6370000000 HC RX 637 (ALT 250 FOR IP): Performed by: EMERGENCY MEDICINE

## 2024-01-30 RX ORDER — QUETIAPINE FUMARATE 100 MG/1
100 TABLET, FILM COATED ORAL
Status: COMPLETED | OUTPATIENT
Start: 2024-01-30 | End: 2024-01-30

## 2024-01-30 RX ADMIN — QUETIAPINE FUMARATE 100 MG: 100 TABLET ORAL at 15:26

## 2024-01-30 NOTE — ED NOTES
Pt sitting upright on stretcher, eating provided breakfast tray. NAD voiced or noted. Pt A&Ox4, ambulatory. Denies further needs at this time

## 2024-01-30 NOTE — ED NOTES
Pt pacing around the room. Spoke to ER MD regarding a now dose of Ativan for pt, as pt previously tolerated.    Awaiting orders.

## 2024-01-30 NOTE — ED NOTES
Joann from [facility] called back;     [facility] sent [facility] office and Delbert from Crisis copies of the TDO    Awaiting arrival of  office; pt otherwise is ready for transport

## 2024-01-30 NOTE — ED NOTES
RN called [facility] to obtain bed assignment.       [facility] bed assignment is 319B

## 2024-01-30 NOTE — BSMART NOTE
Crisis note:    Joann of [facility] called to inform that patient has been accepted to [facility]    Accepting is Dr. Doris Salas  Report number is [phone]    Dr. Lin in the ER informed.    Father called and informed of his son being accepted to [facility].    Janna, patient's mother called, [phone] to get address to [facility] and asked some general questions regarding the process of him going to [facility].    Grupo, patient's father [phone]

## 2024-01-30 NOTE — ED NOTES
Pt noted to be pacing room, adjusting camera. RN placed camera back to original spot. RN asked pt if he needed anything, pt declined.     Sitter at doorway

## 2024-01-30 NOTE — BSMART NOTE
Crisis note:    Received a call from Joann of [facility] informed patients non executed TDO has  and a re-evaluation needs to be done by the [facility]. She states Danni will be called to do the re-evaluation.    Danni called to do re-evaluation. Crisis phone taken to the ER. Danni did not have the ability to face time thus her screening was done just by her speaking to him.   Patient continues to have disorganized thoughts and was making some odd jerky body movements. Seemed slightly irritable.

## 2024-01-30 NOTE — ED NOTES
Pt father no longer at bedside    Pt resting in bed with eyes closed. Respirations are even, equal and unlabored. No signs of cardiopulmonary distress noted.     Sitter at doorway

## 2024-01-30 NOTE — PROGRESS NOTES
completed the initial Spiritual Assessment of the patient, and offered Pastoral Care, support to the patient in bed 17 of the emergency room where he will be admitted to the hospital from.. There is no advance directive on file.Patient does not have any Sabianist/cultural needs that will affect patient’s preferences in health care. Chaplains will continue to follow and will provide pastoral care on an as needed/requested basis.    Chaplain Tung Interiano  Board Certified   Spiritual Care Department  [phone]

## 2024-01-30 NOTE — ED NOTES
Anyi Sue called to receive an assessment from RN.  is currently searching for transportation for pt.       Ms. Sue is the Supervisor for [facility]

## 2024-01-30 NOTE — BSMART NOTE
Crisis note:    TDO faxed to crisis by Joann of [facility]. TDO paperwork taken to the ER, copy to unit secretary and TDO paperwork placed on copier, charge nurse aware.
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
## 2024-01-30 NOTE — ED PROVIDER NOTES
ED continued care note    6:08 AM EST  Signed out to me by signed out to me by Dr. Ceballos patient with psychosis on TDO, awaiting placement    Reviewed at 6:08 AM EST  Patient Vitals for the past 12 hrs:   Temp Pulse Resp BP SpO2   01/30/24 0600 -- 90 20 (!) 140/79 99 %   01/30/24 0230 -- 92 20 132/61 100 %   01/29/24 1940 97.9 °F (36.6 °C) 70 18 118/75 100 %       ED Course as of 01/30/24 0608   Mon Jan 29, 2024   0430 Patient with altered mental status, suspect secondary to polysubstance use.  Initiating metabolic workup as well as alcohol and UDS, EKG.  Do not feel that imaging of the head is warranted at this time as her neurologic examination is nonfocal [SH]   0441 My independent review of the EKG is sinus rhythm at 77.  Normal axis and intervals.  No significant ST or T wave changes.  QTc is 416 [SH]   0802 Patient is status improving.  More appropriate speak with me.  Drinking water in the emergency department that difficulty.  Workup today unremarkable.  UDS is still pending but I would not hold his discharge for that.  He is reaching out to his father regarding a ride home.  PA will release the patient when he has a safe ride home. [SH]      ED Course User Index  [SH] Maxwell Loo MD       Clinical Impression:   1. Psychosis, unspecified psychosis type (HCC)    2. Transient alteration of awareness    3. Substance abuse (HCC)

## 2024-01-30 NOTE — ED NOTES
Bedside report received from [address] [place] [address]. Care assumed at this time. Report called and given to JANAY Stern at [facility]     aware of pt transfer

## 2024-01-30 NOTE — BSMART NOTE
Crisis Note: LEVI Peraza, informed writer that patient has been declined admission at several  facilities d/t aggressive behavior, and most likely, patient will be placed at a state hospital. Writer also notified Karmen that the  has not arrived to execute the TDO. Karmen verbalized that an officer will be coming to execute the TDO; will assist as needed.

## 2024-01-30 NOTE — BSMART NOTE
Crisis note:    Patient's father, Grupo, called regarding his son. He wanted to know if his son got his glasses he brought for him and also asked the question of why he could not see him when he brought the glasses for him.     This writer told patient's father regarding visiting his son that was up to the ER and their policy but did tell him that I would go see if his son has his glasses. Also told father I would call him and update him on the treatment for his son and the current TDO bed search in progress.  Patient does have a pair of glasses that he currently is wearing.    Grupo Rios [phone]

## 2024-01-30 NOTE — ED NOTES
RN spoke with CCL and patient father, pt father permitted to see pt with Crisis/CCL aware. Pt father updated on plan of care.     Sitter at doorway    RN had pt father remove items form pockets and place on a table outside of the door previous to entering pt room. RN informed pt father to reach out to RN with nay questions or concerns.

## 2024-01-30 NOTE — ED NOTES
RN spoke with pt mother to conform allergies; only allergy is \"all animal milk\"    Pt mother said pt could have the offered susan crackers/saltines and grapejuice

## 2024-01-30 NOTE — ED NOTES
Joann from [facility] called regarding pt acceptance into facility     Accepting Facility: [facility]   Accepting Physician: Dr. Doris Salas   N2N report #: [phone]      Pt is a TDO, however Dennis LEZAMA is not at ED to start TDO. Joann reports she will contact Dennis LEZAMA and call back with an update regarding TDO.     Charge nurse aware.

## 2024-01-30 NOTE — BSMART NOTE
Crisis Note: LEVI Alonzo, informed this writer that an officer from [facility] Dept will execute the TDO on patient. Kirit, ED-RN, charge, made aware. Crisis will assist as needed.    Also, spoke with [facility], [phone], who stated that patient's clinicals are still for review.

## 2024-01-30 NOTE — ED NOTES
Call to [facility] office notified- of pt having room at [facility]. Rm 319 B   Spoke with dispatch-as soon as one of the teams clears up he will send them to pick pt up.    Primary nurse made

## 2024-01-31 NOTE — ED NOTES
Assumed care of patient at this time.   Moreno, ED tech sitting for patient   Denies any other needs at this time.

## 2024-01-31 NOTE — ED NOTES
TDO executed by Officer Geena Darby.   EMTALA completed and paperwork given to transport team.  Pt had no belongings in the ED as they were sent home with father upon his arrival to ED.  Pt stable and ambulatory upon transport

## 2024-01-31 NOTE — BSMART NOTE
Crisis note:    Danni of [facility] informed that the [employer] are in route to execute patient's TDO and [facility] will be taking patient to [facility].

## 2024-01-31 NOTE — ED NOTES
Spoke with Delbert from crisis, Dennis LEZAMA is on the way with TDO paperwork and [facility] has been arranged to transport to  psych.

## 2024-02-15 ENCOUNTER — OFFICE VISIT (OUTPATIENT)
Age: 24
End: 2024-02-15
Payer: COMMERCIAL

## 2024-02-15 VITALS
DIASTOLIC BLOOD PRESSURE: 77 MMHG | HEART RATE: 109 BPM | TEMPERATURE: 98.6 F | OXYGEN SATURATION: 98 % | HEIGHT: 71 IN | SYSTOLIC BLOOD PRESSURE: 133 MMHG | BODY MASS INDEX: 23.69 KG/M2 | WEIGHT: 169.2 LBS | RESPIRATION RATE: 17 BRPM

## 2024-02-15 DIAGNOSIS — F25.9 SCHIZOAFFECTIVE DISORDER, UNSPECIFIED TYPE (HCC): Primary | ICD-10-CM

## 2024-02-15 DIAGNOSIS — K59.00 CONSTIPATION, UNSPECIFIED CONSTIPATION TYPE: ICD-10-CM

## 2024-02-15 PROCEDURE — 99211 OFF/OP EST MAY X REQ PHY/QHP: CPT

## 2024-02-15 PROCEDURE — 99213 OFFICE O/P EST LOW 20 MIN: CPT | Performed by: INTERNAL MEDICINE

## 2024-02-15 ASSESSMENT — PATIENT HEALTH QUESTIONNAIRE - PHQ9
1. LITTLE INTEREST OR PLEASURE IN DOING THINGS: 2
8. MOVING OR SPEAKING SO SLOWLY THAT OTHER PEOPLE COULD HAVE NOTICED. OR THE OPPOSITE, BEING SO FIGETY OR RESTLESS THAT YOU HAVE BEEN MOVING AROUND A LOT MORE THAN USUAL: 1
6. FEELING BAD ABOUT YOURSELF - OR THAT YOU ARE A FAILURE OR HAVE LET YOURSELF OR YOUR FAMILY DOWN: 2
SUM OF ALL RESPONSES TO PHQ QUESTIONS 1-9: 11
5. POOR APPETITE OR OVEREATING: 0
2. FEELING DOWN, DEPRESSED OR HOPELESS: 2
SUM OF ALL RESPONSES TO PHQ QUESTIONS 1-9: 11
SUM OF ALL RESPONSES TO PHQ9 QUESTIONS 1 & 2: 4
10. IF YOU CHECKED OFF ANY PROBLEMS, HOW DIFFICULT HAVE THESE PROBLEMS MADE IT FOR YOU TO DO YOUR WORK, TAKE CARE OF THINGS AT HOME, OR GET ALONG WITH OTHER PEOPLE: 0
9. THOUGHTS THAT YOU WOULD BE BETTER OFF DEAD, OR OF HURTING YOURSELF: 0
3. TROUBLE FALLING OR STAYING ASLEEP: 2
4. FEELING TIRED OR HAVING LITTLE ENERGY: 0
SUM OF ALL RESPONSES TO PHQ QUESTIONS 1-9: 11
SUM OF ALL RESPONSES TO PHQ QUESTIONS 1-9: 11

## 2024-02-15 NOTE — PATIENT INSTRUCTIONS
Latest Reference Range & Units 12/11/23 11:10 01/29/24 04:18   Sodium 136 - 145 mmol/L 141 139   Potassium 3.5 - 5.5 mmol/L 4.3 3.5   Chloride 100 - 111 mmol/L 107 108   CO2 21 - 32 mmol/L 33 (H) 27   BUN,BUNPL 7.0 - 18 MG/DL 11 11   Creatinine 0.6 - 1.3 MG/DL 0.91 1.07   Bun/Cre Ratio 12 - 20   12 10 (L)   Anion Gap 3.0 - 18 mmol/L 1 (L) 4   Est, Glom Filt Rate >60 ml/min/1.73m2 >60 >60   Magnesium 1.6 - 2.6 mg/dL  2.0   Glucose, Random 74 - 99 mg/dL 95 94   CALCIUM, SERUM, 646824 8.5 - 10.1 MG/DL 9.5 9.3   ALBUMIN/GLOBULIN RATIO 0.8 - 1.7   1.9 (H) 1.6   Total Protein 6.4 - 8.2 g/dL 6.7 7.1   LIPID PANEL -        Chol/HDL Ratio 0 - 5.0   2.5    Cholesterol, Total <200 MG/    HDL Cholesterol 40 - 60 MG/DL 44    LDL Calculated 0 - 100 MG/DL 53.6    Triglycerides <150 MG/DL 57    VLDL Cholesterol Calculated MG/DL 11.4    Albumin 3.4 - 5.0 g/dL 4.4 4.4   Globulin 2.0 - 4.0 g/dL 2.3 2.7   Alk Phos 45 - 117 U/L 89 72   ALT 16 - 61 U/L 19 35   AST 10 - 38 U/L 15 24   BILIRUBIN TOTAL 0.2 - 1.0 MG/DL 0.4 1.0   Ethanol Lvl 0 - 3 MG/DL  <3   WBC 4.6 - 13.2 K/uL 5.4 7.5   RBC 4.35 - 5.65 M/uL 5.68 (H) 5.30   Hemoglobin Quant 13.0 - 16.0 g/dL 15.9 15.1   Hematocrit 36.0 - 48.0 % 48.7 (H) 44.3   MCV 78.0 - 100.0 FL 85.7 83.6   MCH 24.0 - 34.0 PG 28.0 28.5   MCHC 31.0 - 37.0 g/dL 32.6 34.1   MPV 9.2 - 11.8 FL 11.2 10.1   RDW 11.6 - 14.5 % 11.9 11.9   Platelet Count 135 - 420 K/uL 257 251   Neutrophils % 40 - 73 % 60 65   Lymphocyte % 21 - 52 % 24 24   Monocytes % 3 - 10 % 6 8   Eosinophils % 0 - 5 % 9 (H) 1   Basophils % 0 - 2 % 1 1   Neutrophils Absolute 1.8 - 8.0 K/UL 3.3 4.9   Lymphocytes Absolute 0.9 - 3.6 K/UL 1.3 1.8   Monocytes Absolute 0.05 - 1.2 K/UL 0.3 0.6   Eosinophils Absolute 0.0 - 0.4 K/UL 0.5 (H) 0.1   Basophils Absolute 0.0 - 0.1 K/UL 0.1 0.1   Differential Type -   AUTOMATED AUTOMATED   Immature Granulocytes 0.0 - 0.5 % 0 0   Nucleated Red Blood Cells 0  WBC  0.00 - 0.01 K/uL 0.0  0.00 0.0  0.00

## 2024-02-15 NOTE — PROGRESS NOTES
Chuy Gabriel presents today for   Chief Complaint   Patient presents with    Follow-up           \"Have you been to the ER, urgent care clinic since your last visit?  Hospitalized since your last visit?\"    YES - When: approximately 3  weeks ago.  Where and Why: [facility].    “Have you seen or consulted any other health care providers outside of [facility] since your last visit?”    YES - When: approximately 2  weeks ago.  [facility] and [facility].

## 2024-02-15 NOTE — PROGRESS NOTES
INTERNISTS OF [facility]:  2024, MRN: [id number]      Chuy Rios is a 23 y.o. male and presents to clinic for Follow-up      Subjective:   The pt is a 22yo male with a h/o allergic rhinitis, schizoaffective disorder, eosinophilic esophagitis, and asthma.     AMS: S/p ED visit on 24. I reviewed the ED records. He was evaluated in the ED. Today he reports: He is taking his prescribed medication. +Unremarkable head CT 24. His UDS was unremarkable.  He was subsequently diagnosed with schizoaffective disorder.  No suicidal ideation since hospital discharge.    Apt with Psychiatry: he is scheduled with psychiatry 24  Counselin24 begins.      Patient Active Problem List    Diagnosis Date Noted    Mild intermittent asthma without complication 10/29/2023    Eosinophilic esophagitis 10/29/2023       Current Outpatient Medications   Medication Sig Dispense Refill    ibuprofen (ADVIL;MOTRIN) 200 MG tablet Take by mouth      EPINEPHrine (AUVI-Q) 0.3 MG/0.3ML SOAJ injection Use as directed for allergic reaction 1 each 2     No current facility-administered medications for this visit.       Allergies   Allergen Reactions    Milk (Cow) Anaphylaxis     ANYTHING DAIRY OR DAIRY CONTAINING       Past Medical History:   Diagnosis Date    Asthma        Past Surgical History:   Procedure Laterality Date    CIRCUMCISION      WISDOM TOOTH EXTRACTION         No family history on file.    Social History     Tobacco Use    Smoking status: Never    Smokeless tobacco: Not on file   Substance Use Topics    Alcohol use: No       ROS   Review of Systems   Constitutional:  Negative for fever.   HENT:  Negative for ear pain and sore throat.    Eyes:  Negative for pain and visual disturbance.   Respiratory:  Negative for cough and shortness of breath.    Cardiovascular:  Negative for chest pain.   Gastrointestinal:  Negative for abdominal pain, anal bleeding and blood in stool.   Genitourinary:  Negative for dysuria and

## 2024-02-21 RX ORDER — EPINEPHRINE 0.3 MG/.3ML
INJECTION SUBCUTANEOUS
Qty: 1 EACH | Refills: 2 | Status: SHIPPED | OUTPATIENT
Start: 2024-02-21

## 2024-02-21 ASSESSMENT — ENCOUNTER SYMPTOMS
SORE THROAT: 0
EYE PAIN: 0
ABDOMINAL PAIN: 0
ANAL BLEEDING: 0
COUGH: 0
SHORTNESS OF BREATH: 0
BLOOD IN STOOL: 0

## 2024-03-26 ENCOUNTER — OFFICE VISIT (OUTPATIENT)
Age: 24
End: 2024-03-26
Payer: COMMERCIAL

## 2024-03-26 VITALS
SYSTOLIC BLOOD PRESSURE: 133 MMHG | BODY MASS INDEX: 26.94 KG/M2 | HEIGHT: 71 IN | WEIGHT: 192.4 LBS | OXYGEN SATURATION: 100 % | DIASTOLIC BLOOD PRESSURE: 73 MMHG | TEMPERATURE: 98.2 F | RESPIRATION RATE: 16 BRPM | HEART RATE: 90 BPM

## 2024-03-26 DIAGNOSIS — Z51.81 ENCOUNTER FOR THERAPEUTIC DRUG LEVEL MONITORING: ICD-10-CM

## 2024-03-26 DIAGNOSIS — R19.5 ABNORMAL STOOLS: ICD-10-CM

## 2024-03-26 DIAGNOSIS — F25.9 SCHIZOAFFECTIVE DISORDER, UNSPECIFIED TYPE (HCC): Primary | ICD-10-CM

## 2024-03-26 DIAGNOSIS — F43.9 STRESS: ICD-10-CM

## 2024-03-26 PROCEDURE — 99211 OFF/OP EST MAY X REQ PHY/QHP: CPT

## 2024-03-26 PROCEDURE — 99214 OFFICE O/P EST MOD 30 MIN: CPT | Performed by: INTERNAL MEDICINE

## 2024-03-26 RX ORDER — RISPERIDONE 2 MG/1
2 TABLET ORAL 2 TIMES DAILY
COMMUNITY

## 2024-03-26 RX ORDER — DIVALPROEX SODIUM 500 MG/1
500 TABLET, DELAYED RELEASE ORAL 3 TIMES DAILY
COMMUNITY

## 2024-03-26 RX ORDER — TRAZODONE HYDROCHLORIDE 100 MG/1
100 TABLET ORAL NIGHTLY
COMMUNITY

## 2024-03-26 ASSESSMENT — ENCOUNTER SYMPTOMS
DIARRHEA: 1
ANAL BLEEDING: 0
EYE PAIN: 0
SHORTNESS OF BREATH: 0
BLOOD IN STOOL: 0
ABDOMINAL PAIN: 0
COUGH: 0
SORE THROAT: 0

## 2024-03-26 NOTE — PROGRESS NOTES
Constitutional:  Negative for fever.   HENT:  Negative for ear pain and sore throat.    Eyes:  Negative for pain and visual disturbance.   Respiratory:  Negative for cough and shortness of breath.    Cardiovascular:  Negative for chest pain.   Gastrointestinal:  Positive for diarrhea (off/on). Negative for abdominal pain, anal bleeding and blood in stool.   Genitourinary:  Negative for dysuria and hematuria.   Musculoskeletal:  Negative for arthralgias and myalgias.   Skin:  Negative for rash.   Neurological:  Negative for headaches.   Hematological:  Does not bruise/bleed easily.   Psychiatric/Behavioral:  Negative for suicidal ideas.         See HPI         Objective     /73 (Site: Right Upper Arm, Position: Sitting, Cuff Size: Medium Adult)   Pulse 90   Temp 98.2 °F (36.8 °C) (Temporal)   Resp 16   Ht 1.803 m (5' 11\")   Wt 87.3 kg (192 lb 6.4 oz)   SpO2 100%   BMI 26.83 kg/m²      Physical Exam  Constitutional:       Appearance: Normal appearance.   HENT:      Head: Normocephalic and atraumatic.      Right Ear: External ear normal.      Left Ear: External ear normal.   Eyes:      General: No scleral icterus.        Right eye: No discharge.         Left eye: No discharge.      Conjunctiva/sclera: Conjunctivae normal.   Cardiovascular:      Rate and Rhythm: Normal rate and regular rhythm.      Pulses: Normal pulses.      Heart sounds: Normal heart sounds.   Pulmonary:      Effort: Pulmonary effort is normal.      Breath sounds: Normal breath sounds.   Abdominal:      General: Abdomen is flat. Bowel sounds are normal. There is no distension.      Palpations: Abdomen is soft.      Tenderness: There is no abdominal tenderness.   Musculoskeletal:         General: No swelling (BUE) or tenderness (BUE).      Cervical back: Neck supple.   Lymphadenopathy:      Cervical: No cervical adenopathy.   Skin:     General: Skin is warm and dry.      Findings: No erythema.   Neurological:      Mental Status: He is

## 2024-06-07 ENCOUNTER — HOSPITAL ENCOUNTER (OUTPATIENT)
Facility: HOSPITAL | Age: 24
End: 2024-06-07
Payer: COMMERCIAL

## 2024-06-07 DIAGNOSIS — K62.5 RECTAL BLEEDING: ICD-10-CM

## 2024-06-07 PROCEDURE — 83993 ASSAY FOR CALPROTECTIN FECAL: CPT

## 2024-06-16 LAB — CALPROTECTIN STL-MCNT: 5 UG/G (ref 0–120)

## 2024-08-02 ENCOUNTER — OFFICE VISIT (OUTPATIENT)
Facility: CLINIC | Age: 24
End: 2024-08-02
Payer: COMMERCIAL

## 2024-08-02 VITALS
BODY MASS INDEX: 28 KG/M2 | TEMPERATURE: 98.7 F | SYSTOLIC BLOOD PRESSURE: 123 MMHG | WEIGHT: 200 LBS | DIASTOLIC BLOOD PRESSURE: 79 MMHG | OXYGEN SATURATION: 97 % | HEIGHT: 71 IN | HEART RATE: 93 BPM | RESPIRATION RATE: 18 BRPM

## 2024-08-02 DIAGNOSIS — K62.5 RECTAL BLEEDING: ICD-10-CM

## 2024-08-02 DIAGNOSIS — F25.9 SCHIZOAFFECTIVE DISORDER, UNSPECIFIED TYPE (HCC): Primary | ICD-10-CM

## 2024-08-02 DIAGNOSIS — K59.00 CONSTIPATION, UNSPECIFIED CONSTIPATION TYPE: ICD-10-CM

## 2024-08-02 DIAGNOSIS — M62.89 ABNORMAL INCREASED MUSCLE TONE: ICD-10-CM

## 2024-08-02 PROCEDURE — 99214 OFFICE O/P EST MOD 30 MIN: CPT | Performed by: INTERNAL MEDICINE

## 2024-08-02 NOTE — PROGRESS NOTES
Chuy Rios presents today for   Chief Complaint   Patient presents with    Follow-up     7 wks f/u           \"Have you been to the ER, urgent care clinic since your last visit?  Hospitalized since your last visit?\"    NO    “Have you seen or consulted any other health care providers outside of [facility] since your last visit?”    YES - When: approximately 1  weeks ago.  Where and Why: Therapy-f/u.
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
## 2024-12-19 ENCOUNTER — OFFICE VISIT (OUTPATIENT)
Facility: CLINIC | Age: 24
End: 2024-12-19
Payer: COMMERCIAL

## 2024-12-19 VITALS
SYSTOLIC BLOOD PRESSURE: 118 MMHG | RESPIRATION RATE: 18 BRPM | DIASTOLIC BLOOD PRESSURE: 73 MMHG | TEMPERATURE: 97.8 F | HEIGHT: 71 IN | WEIGHT: 220.2 LBS | OXYGEN SATURATION: 98 % | BODY MASS INDEX: 30.83 KG/M2 | HEART RATE: 80 BPM

## 2024-12-19 DIAGNOSIS — T50.905A DRUG-INDUCED WEIGHT GAIN: ICD-10-CM

## 2024-12-19 DIAGNOSIS — R63.5 DRUG-INDUCED WEIGHT GAIN: ICD-10-CM

## 2024-12-19 DIAGNOSIS — F25.9 SCHIZOAFFECTIVE DISORDER, UNSPECIFIED TYPE (HCC): Primary | ICD-10-CM

## 2024-12-19 DIAGNOSIS — Z13.220 SCREENING FOR HYPERLIPIDEMIA: ICD-10-CM

## 2024-12-19 DIAGNOSIS — Z87.19 HISTORY OF RECTAL BLEEDING: ICD-10-CM

## 2024-12-19 DIAGNOSIS — Z51.81 ENCOUNTER FOR THERAPEUTIC DRUG LEVEL MONITORING: ICD-10-CM

## 2024-12-19 PROCEDURE — 99214 OFFICE O/P EST MOD 30 MIN: CPT | Performed by: INTERNAL MEDICINE

## 2024-12-19 SDOH — ECONOMIC STABILITY: FOOD INSECURITY: WITHIN THE PAST 12 MONTHS, THE FOOD YOU BOUGHT JUST DIDN'T LAST AND YOU DIDN'T HAVE MONEY TO GET MORE.: NEVER TRUE

## 2024-12-19 SDOH — ECONOMIC STABILITY: INCOME INSECURITY: HOW HARD IS IT FOR YOU TO PAY FOR THE VERY BASICS LIKE FOOD, HOUSING, MEDICAL CARE, AND HEATING?: NOT HARD AT ALL

## 2024-12-19 SDOH — ECONOMIC STABILITY: FOOD INSECURITY: WITHIN THE PAST 12 MONTHS, YOU WORRIED THAT YOUR FOOD WOULD RUN OUT BEFORE YOU GOT MONEY TO BUY MORE.: NEVER TRUE

## 2024-12-19 NOTE — PROGRESS NOTES
Chuy Rios presents today for   Chief Complaint   Patient presents with    Annual Exam           \"Have you been to the ER, urgent care clinic since your last visit?  Hospitalized since your last visit?\"    NO    “Have you seen or consulted any other health care providers outside of [facility] since your last visit?”    YES - When: approximately 2  weeks ago.  Where and Why: Therapy-f/u.

## 2024-12-19 NOTE — PROGRESS NOTES
[facility]:  12/25/2024, MRN: [id number]      Chuy Rios is a 24 y.o. male and presents to clinic for Annual Exam      Subjective:   The pt is a 23yo male with a h/o allergic rhinitis, schizoaffective disorder, eosinophilic esophagitis, vitamin D deficiency, and asthma.      1. Schizoaffective Disorder: He is working with a . Depression: none. \"I think I'm doing alright.\" He stopped going to therapy 2 wks ago due to his therapist moving away. Taking: prozac 20mg bid, invega 156mg monthly, 750mg depakote daily, and trazodone 50mg daily. ETOH: none. No tobacco/vaping. Drug use: none.     +BUE weakness.  On exam he had slightly increased muscle tone but weakness along his upper extremities.  A referral was placed to Neurology at his last apt but he never scheduled an apt. He gained 20lbs since his last apt. +He drinks [employer] energy drinks.    2. Rectal Bleeding and Constipation Hx: He had a negative fecal calprotectin and a negative occult blood stool test. \"I think I'm doing pretty good.\" He is not taking any laxatives. No constipation sx.  +H/o eosinophilic esophagitis.  Asymptomatic.      Patient Active Problem List    Diagnosis Date Noted    Mild intermittent asthma without complication 10/29/2023    Eosinophilic esophagitis 10/29/2023       Current Outpatient Medications   Medication Sig Dispense Refill    FLUoxetine (PROZAC) 20 MG capsule Take 1 capsule by mouth 2 times daily      docusate sodium (COLACE) 100 MG capsule Take 1 capsule by mouth 2 times daily 60 capsule 5    paliperidone palmitate ER (INVEGA SUSTENNA) 156 MG/ML VELIA IM injection Inject 156 mg into the muscle every 28 days      divalproex (DEPAKOTE) 500 MG DR tablet Take 1 tablet by mouth nightly 1 and 1/2 tab      traZODone (DESYREL) 100 MG tablet Take 0.5 tablets by mouth nightly      EPINEPHrine (AUVI-Q) 0.3 MG/0.3ML SOAJ injection Use as directed for allergic reaction 1 each 2    ibuprofen (ADVIL;MOTRIN) 200 MG

## 2024-12-25 ASSESSMENT — ENCOUNTER SYMPTOMS
COUGH: 0
ABDOMINAL PAIN: 0
EYE PAIN: 0
ANAL BLEEDING: 0
SHORTNESS OF BREATH: 0
BLOOD IN STOOL: 0
SORE THROAT: 0

## 2025-04-24 ENCOUNTER — OFFICE VISIT (OUTPATIENT)
Facility: CLINIC | Age: 25
End: 2025-04-24
Payer: COMMERCIAL

## 2025-04-24 ENCOUNTER — TELEPHONE (OUTPATIENT)
Facility: CLINIC | Age: 25
End: 2025-04-24

## 2025-04-24 VITALS
DIASTOLIC BLOOD PRESSURE: 74 MMHG | HEART RATE: 70 BPM | SYSTOLIC BLOOD PRESSURE: 122 MMHG | HEIGHT: 71 IN | OXYGEN SATURATION: 97 % | RESPIRATION RATE: 18 BRPM | WEIGHT: 232 LBS | TEMPERATURE: 98.2 F | BODY MASS INDEX: 32.48 KG/M2

## 2025-04-24 DIAGNOSIS — F25.9 SCHIZOAFFECTIVE DISORDER, UNSPECIFIED TYPE (HCC): Primary | ICD-10-CM

## 2025-04-24 DIAGNOSIS — K20.0 EOSINOPHILIC ESOPHAGITIS: ICD-10-CM

## 2025-04-24 DIAGNOSIS — J45.20 MILD INTERMITTENT ASTHMA WITHOUT COMPLICATION: ICD-10-CM

## 2025-04-24 PROCEDURE — 99214 OFFICE O/P EST MOD 30 MIN: CPT | Performed by: INTERNAL MEDICINE

## 2025-04-24 RX ORDER — ALBUTEROL SULFATE 90 UG/1
2 INHALANT RESPIRATORY (INHALATION) EVERY 6 HOURS PRN
Qty: 18 G | Refills: 5 | Status: SHIPPED | OUTPATIENT
Start: 2025-04-24

## 2025-04-24 RX ORDER — OMEPRAZOLE 40 MG/1
40 CAPSULE, DELAYED RELEASE ORAL
Qty: 90 CAPSULE | Refills: 1 | Status: SHIPPED | OUTPATIENT
Start: 2025-04-24 | End: 2025-04-24

## 2025-04-24 RX ORDER — ALBUTEROL SULFATE 90 UG/1
2 INHALANT RESPIRATORY (INHALATION) EVERY 6 HOURS PRN
Qty: 18 G | Refills: 5 | Status: SHIPPED | OUTPATIENT
Start: 2025-04-24 | End: 2025-04-24

## 2025-04-24 RX ORDER — OMEPRAZOLE 40 MG/1
40 CAPSULE, DELAYED RELEASE ORAL
Qty: 90 CAPSULE | Refills: 1 | Status: SHIPPED | OUTPATIENT
Start: 2025-04-24

## 2025-04-24 SDOH — ECONOMIC STABILITY: FOOD INSECURITY: WITHIN THE PAST 12 MONTHS, YOU WORRIED THAT YOUR FOOD WOULD RUN OUT BEFORE YOU GOT MONEY TO BUY MORE.: NEVER TRUE

## 2025-04-24 SDOH — ECONOMIC STABILITY: FOOD INSECURITY: WITHIN THE PAST 12 MONTHS, THE FOOD YOU BOUGHT JUST DIDN'T LAST AND YOU DIDN'T HAVE MONEY TO GET MORE.: NEVER TRUE

## 2025-04-24 ASSESSMENT — PATIENT HEALTH QUESTIONNAIRE - PHQ9
SUM OF ALL RESPONSES TO PHQ QUESTIONS 1-9: 1
7. TROUBLE CONCENTRATING ON THINGS, SUCH AS READING THE NEWSPAPER OR WATCHING TELEVISION: NOT AT ALL
4. FEELING TIRED OR HAVING LITTLE ENERGY: NOT AT ALL
3. TROUBLE FALLING OR STAYING ASLEEP: SEVERAL DAYS
SUM OF ALL RESPONSES TO PHQ QUESTIONS 1-9: 1
SUM OF ALL RESPONSES TO PHQ QUESTIONS 1-9: 1
2. FEELING DOWN, DEPRESSED OR HOPELESS: NOT AT ALL
5. POOR APPETITE OR OVEREATING: NOT AT ALL
1. LITTLE INTEREST OR PLEASURE IN DOING THINGS: NOT AT ALL
SUM OF ALL RESPONSES TO PHQ QUESTIONS 1-9: 1
9. THOUGHTS THAT YOU WOULD BE BETTER OFF DEAD, OR OF HURTING YOURSELF: NOT AT ALL
10. IF YOU CHECKED OFF ANY PROBLEMS, HOW DIFFICULT HAVE THESE PROBLEMS MADE IT FOR YOU TO DO YOUR WORK, TAKE CARE OF THINGS AT HOME, OR GET ALONG WITH OTHER PEOPLE: NOT DIFFICULT AT ALL
8. MOVING OR SPEAKING SO SLOWLY THAT OTHER PEOPLE COULD HAVE NOTICED. OR THE OPPOSITE, BEING SO FIGETY OR RESTLESS THAT YOU HAVE BEEN MOVING AROUND A LOT MORE THAN USUAL: NOT AT ALL
6. FEELING BAD ABOUT YOURSELF - OR THAT YOU ARE A FAILURE OR HAVE LET YOURSELF OR YOUR FAMILY DOWN: NOT AT ALL

## 2025-04-24 NOTE — TELEPHONE ENCOUNTER
Rx sent to [facility].    Dr. Shital Mabry  [facility] [address], [address]  [place] [address]  Phone: [phone]  Fax: [phone]

## 2025-04-24 NOTE — TELEPHONE ENCOUNTER
Patient's pharmacy was listed incorrectly in his chart. Please transfer new prescriptions to [facility] on [address]. Chart updated to show correct pharmacy used.

## 2025-04-24 NOTE — PROGRESS NOTES
Chuy Rios presents today for   Chief Complaint   Patient presents with    eosinophilic esophagitis     Has been bothering him more recently           \"Have you been to the ER, urgent care clinic since your last visit?  Hospitalized since your last visit?\"    NO    “Have you seen or consulted any other health care providers outside of [facility] since your last visit?”    NO           
Dispense Refill    albuterol sulfate HFA (VENTOLIN HFA) 108 (90 Base) MCG/ACT inhaler Inhale 2 puffs into the lungs every 6 hours as needed for Wheezing 18 g 5    omeprazole (PRILOSEC) 40 MG delayed release capsule Take 1 capsule by mouth every morning (before breakfast) 90 capsule 1    FLUoxetine (PROZAC) 20 MG capsule Take 1 capsule by mouth 2 times daily      docusate sodium (COLACE) 100 MG capsule Take 1 capsule by mouth 2 times daily 60 capsule 5    paliperidone palmitate ER (INVEGA SUSTENNA) 156 MG/ML VELIA IM injection Inject 156 mg into the muscle every 28 days      divalproex (DEPAKOTE) 500 MG DR tablet Take 1 tablet by mouth nightly 1 and 1/2 tab      traZODone (DESYREL) 100 MG tablet Take 0.5 tablets by mouth nightly      EPINEPHrine (AUVI-Q) 0.3 MG/0.3ML SOAJ injection Use as directed for allergic reaction 1 each 2    ibuprofen (ADVIL;MOTRIN) 200 MG tablet Take by mouth       No current facility-administered medications for this visit.       Allergies   Allergen Reactions    Milk (Cow) Anaphylaxis     ANYTHING DAIRY OR DAIRY CONTAINING       Past Medical History:   Diagnosis Date    Asthma        Past Surgical History:   Procedure Laterality Date    CIRCUMCISION      WISDOM TOOTH EXTRACTION         No family history on file.    Social History     Tobacco Use    Smoking status: Never    Smokeless tobacco: Never   Substance Use Topics    Alcohol use: No       ROS   Review of Systems   Constitutional:  Negative for fever.   HENT:  Negative for ear pain and sore throat.    Eyes:  Negative for pain and visual disturbance.   Respiratory:  Positive for shortness of breath. Negative for cough.    Cardiovascular:  Negative for chest pain.   Gastrointestinal:  Positive for blood in stool (see HPI). Negative for abdominal pain and anal bleeding.   Genitourinary:  Negative for dysuria and hematuria.   Musculoskeletal:  Negative for arthralgias and myalgias.   Skin:  Negative for rash.   Allergic/Immunologic: Positive

## 2025-04-30 ASSESSMENT — ENCOUNTER SYMPTOMS
SHORTNESS OF BREATH: 1
BLOOD IN STOOL: 1
ABDOMINAL PAIN: 0
ANAL BLEEDING: 0
COUGH: 0
SORE THROAT: 0
EYE PAIN: 0

## 2025-05-12 RX ORDER — EPINEPHRINE 0.3 MG/.3ML
INJECTION SUBCUTANEOUS
Qty: 1 EACH | Refills: 2 | Status: SHIPPED | OUTPATIENT
Start: 2025-05-12

## 2025-05-13 ENCOUNTER — HOSPITAL ENCOUNTER (OUTPATIENT)
Facility: HOSPITAL | Age: 25
Setting detail: SPECIMEN
Discharge: HOME | End: 2025-05-16
Payer: COMMERCIAL

## 2025-05-13 DIAGNOSIS — K20.0 EOSINOPHILIC ESOPHAGITIS: ICD-10-CM

## 2025-05-13 DIAGNOSIS — Z51.81 ENCOUNTER FOR THERAPEUTIC DRUG LEVEL MONITORING: ICD-10-CM

## 2025-05-13 DIAGNOSIS — F25.9 SCHIZOAFFECTIVE DISORDER, UNSPECIFIED TYPE (HCC): ICD-10-CM

## 2025-05-13 DIAGNOSIS — R63.5 DRUG-INDUCED WEIGHT GAIN: ICD-10-CM

## 2025-05-13 DIAGNOSIS — Z13.220 SCREENING FOR HYPERLIPIDEMIA: ICD-10-CM

## 2025-05-13 DIAGNOSIS — T50.905A DRUG-INDUCED WEIGHT GAIN: ICD-10-CM

## 2025-05-13 LAB
ALBUMIN SERPL-MCNC: 3.9 G/DL (ref 3.4–5)
ALBUMIN/GLOB SERPL: 1.6 (ref 0.8–1.7)
ALP SERPL-CCNC: 104 U/L (ref 45–117)
ALT SERPL-CCNC: 28 U/L (ref 10–50)
ANION GAP SERPL CALC-SCNC: 12 MMOL/L (ref 3–18)
AST SERPL-CCNC: 22 U/L (ref 10–38)
BASOPHILS # BLD: 0.04 K/UL (ref 0–0.1)
BASOPHILS NFR BLD: 0.8 % (ref 0–2)
BILIRUB SERPL-MCNC: <0.2 MG/DL (ref 0.2–1)
BUN SERPL-MCNC: 10 MG/DL (ref 6–23)
BUN/CREAT SERPL: 10 (ref 12–20)
CALCIUM SERPL-MCNC: 9.8 MG/DL (ref 8.5–10.1)
CHLORIDE SERPL-SCNC: 103 MMOL/L (ref 98–107)
CHOLEST SERPL-MCNC: 135 MG/DL
CO2 SERPL-SCNC: 26 MMOL/L (ref 21–32)
CREAT SERPL-MCNC: 1.03 MG/DL (ref 0.6–1.3)
CRP SERPL-MCNC: <0.3 MG/DL (ref 0–5)
DIFFERENTIAL METHOD BLD: ABNORMAL
EOSINOPHIL # BLD: 0.4 K/UL (ref 0–0.4)
EOSINOPHIL NFR BLD: 7.8 % (ref 0–5)
ERYTHROCYTE [DISTWIDTH] IN BLOOD BY AUTOMATED COUNT: 11.9 % (ref 11.6–14.5)
EST. AVERAGE GLUCOSE BLD GHB EST-MCNC: 108 MG/DL
GLOBULIN SER CALC-MCNC: 2.5 G/DL (ref 2–4)
GLUCOSE SERPL-MCNC: 104 MG/DL (ref 74–108)
HBA1C MFR BLD: 5.4 % (ref 4.2–5.6)
HCT VFR BLD AUTO: 48.1 % (ref 36–48)
HDLC SERPL-MCNC: 29 MG/DL (ref 40–60)
HDLC SERPL: 4.7 (ref 0–5)
HGB BLD-MCNC: 15.1 G/DL (ref 13–16)
IMM GRANULOCYTES # BLD AUTO: 0.02 K/UL (ref 0–0.04)
IMM GRANULOCYTES NFR BLD AUTO: 0.4 % (ref 0–0.5)
LDLC SERPL CALC-MCNC: 81 MG/DL (ref 0–100)
LYMPHOCYTES # BLD: 2.06 K/UL (ref 0.9–3.6)
LYMPHOCYTES NFR BLD: 39.9 % (ref 21–52)
MCH RBC QN AUTO: 26.6 PG (ref 24–34)
MCHC RBC AUTO-ENTMCNC: 31.4 G/DL (ref 31–37)
MCV RBC AUTO: 84.8 FL (ref 78–100)
MONOCYTES # BLD: 0.38 K/UL (ref 0.05–1.2)
MONOCYTES NFR BLD: 7.4 % (ref 3–10)
NEUTS SEG # BLD: 2.26 K/UL (ref 1.8–8)
NEUTS SEG NFR BLD: 43.7 % (ref 40–73)
NRBC # BLD: 0 K/UL (ref 0–0.01)
NRBC BLD-RTO: 0 PER 100 WBC
PLATELET # BLD AUTO: 266 K/UL (ref 135–420)
PMV BLD AUTO: 11 FL (ref 9.2–11.8)
POTASSIUM SERPL-SCNC: 4.2 MMOL/L (ref 3.5–5.5)
PROT SERPL-MCNC: 6.4 G/DL (ref 6.4–8.2)
RBC # BLD AUTO: 5.67 M/UL (ref 4.35–5.65)
SODIUM SERPL-SCNC: 141 MMOL/L (ref 136–145)
T4 FREE SERPL-MCNC: 1.2 NG/DL (ref 0.9–1.7)
TRIGL SERPL-MCNC: 125 MG/DL (ref 0–150)
TSH, 3RD GENERATION: 1.56 UIU/ML (ref 0.27–4.2)
VLDLC SERPL CALC-MCNC: 25 MG/DL
WBC # BLD AUTO: 5.2 K/UL (ref 4.6–13.2)

## 2025-05-13 PROCEDURE — 80061 LIPID PANEL: CPT

## 2025-05-13 PROCEDURE — 36415 COLL VENOUS BLD VENIPUNCTURE: CPT

## 2025-05-13 PROCEDURE — 86003 ALLG SPEC IGE CRUDE XTRC EA: CPT

## 2025-05-13 PROCEDURE — 84439 ASSAY OF FREE THYROXINE: CPT

## 2025-05-13 PROCEDURE — 80053 COMPREHEN METABOLIC PANEL: CPT

## 2025-05-13 PROCEDURE — 84443 ASSAY THYROID STIM HORMONE: CPT

## 2025-05-13 PROCEDURE — 83036 HEMOGLOBIN GLYCOSYLATED A1C: CPT

## 2025-05-13 PROCEDURE — 86140 C-REACTIVE PROTEIN: CPT

## 2025-05-13 PROCEDURE — 85025 COMPLETE CBC W/AUTO DIFF WBC: CPT

## 2025-05-14 ENCOUNTER — HOSPITAL ENCOUNTER (OUTPATIENT)
Facility: HOSPITAL | Age: 25
Setting detail: SPECIMEN
Discharge: HOME | End: 2025-05-17
Payer: COMMERCIAL

## 2025-05-14 DIAGNOSIS — Z51.81 ENCOUNTER FOR THERAPEUTIC DRUG LEVEL MONITORING: ICD-10-CM

## 2025-05-14 DIAGNOSIS — F25.9 SCHIZOAFFECTIVE DISORDER, UNSPECIFIED TYPE (HCC): ICD-10-CM

## 2025-05-14 DIAGNOSIS — F25.9 SCHIZOAFFECTIVE DISORDER, UNSPECIFIED TYPE (HCC): Primary | ICD-10-CM

## 2025-05-14 LAB — VALPROATE SERPL-MCNC: 34 UG/ML (ref 50–100)

## 2025-05-14 PROCEDURE — 80164 ASSAY DIPROPYLACETIC ACD TOT: CPT

## 2025-05-14 PROCEDURE — 36415 COLL VENOUS BLD VENIPUNCTURE: CPT

## 2025-05-16 LAB
CODFISH IGE QN: 0.35 KU/L
COW MILK IGE QN: 27.8 KU/L
EGG WHITE IGE QN: 2.05 KU/L
Lab: ABNORMAL
PEANUT IGE QN: 0.27 KU/L
SOYBEAN IGE QN: 0.18 KU/L
WHEAT IGE QN: 2.78 KU/L

## 2025-05-19 ENCOUNTER — PATIENT MESSAGE (OUTPATIENT)
Facility: CLINIC | Age: 25
End: 2025-05-19

## 2025-05-19 DIAGNOSIS — K20.0 EOSINOPHILIC ESOPHAGITIS: Primary | ICD-10-CM

## 2025-05-19 DIAGNOSIS — K59.00 CONSTIPATION, UNSPECIFIED CONSTIPATION TYPE: ICD-10-CM

## 2025-05-19 DIAGNOSIS — R11.10 VOMITING, UNSPECIFIED VOMITING TYPE, UNSPECIFIED WHETHER NAUSEA PRESENT: ICD-10-CM

## 2025-05-19 DIAGNOSIS — R19.5 ABNORMAL STOOLS: ICD-10-CM

## 2025-05-19 DIAGNOSIS — Z87.19 HISTORY OF RECTAL BLEEDING: ICD-10-CM

## 2025-05-21 NOTE — TELEPHONE ENCOUNTER
Hi Dr. Mabry,       Pt was referred to GI that doesn't accept ins. Pt sent list of providers via [employer]. Contacted [facility] to confirm that ins was accepted. New referral placed due to pt experiencing vomiting and upset stomach. Please review and release.

## 2025-05-30 ENCOUNTER — OFFICE VISIT (OUTPATIENT)
Facility: CLINIC | Age: 25
End: 2025-05-30
Payer: COMMERCIAL

## 2025-05-30 VITALS — BODY MASS INDEX: 32.36 KG/M2 | HEIGHT: 71 IN

## 2025-05-30 DIAGNOSIS — K20.0 EOSINOPHILIC ESOPHAGITIS: Primary | ICD-10-CM

## 2025-05-30 DIAGNOSIS — J45.20 MILD INTERMITTENT ASTHMA WITHOUT COMPLICATION: ICD-10-CM

## 2025-05-30 DIAGNOSIS — T78.40XA ALLERGY, INITIAL ENCOUNTER: ICD-10-CM

## 2025-05-30 PROCEDURE — 99213 OFFICE O/P EST LOW 20 MIN: CPT | Performed by: INTERNAL MEDICINE

## 2025-05-30 NOTE — PATIENT INSTRUCTIONS
Latest Reference Range & Units 05/13/25 08:38   Codfish IgE Class I kU/L 0.35 !   Egg White IgE Class III kU/L 2.05 !   Milk, Cow's IgE Class V kU/L 27.80 !   Peanut IgE Class 0/I kU/L 0.27 !   Soybean IgE Class 0/I kU/L 0.18 !   Wheat IgE Class III kU/L 2.78 !   !: Data is abnormal      **PLEASE AVOID**:   1. FISH  2. DAIRY PRODUCTS  3. WHEAT PRODUCTS  4. SOYBEANS  5. PEANUTS  6. EGGS

## 2025-05-30 NOTE — PROGRESS NOTES
Chuy Rios presents today for   Chief Complaint   Patient presents with    Follow-up     He is not able to see a GI specialist because of his insurance. He was told by the most recent office of the referral that was sent  they do not take his insurance.           \"Have you been to the ER, urgent care clinic since your last visit?  Hospitalized since your last visit?\"    NO    “Have you seen or consulted any other health care providers outside of [facility] since your last visit?”    NO

## 2025-06-05 ENCOUNTER — TELEPHONE (OUTPATIENT)
Facility: CLINIC | Age: 25
End: 2025-06-05

## 2025-06-05 DIAGNOSIS — L90.6 STRIAE: Primary | ICD-10-CM

## 2025-06-05 DIAGNOSIS — R21 RASH: ICD-10-CM

## 2025-06-05 ASSESSMENT — ENCOUNTER SYMPTOMS
BLOOD IN STOOL: 0
ANAL BLEEDING: 0
EYE PAIN: 0
SORE THROAT: 0
COUGH: 0
ABDOMINAL PAIN: 0
SHORTNESS OF BREATH: 0

## 2025-06-06 ENCOUNTER — PATIENT MESSAGE (OUTPATIENT)
Facility: CLINIC | Age: 25
End: 2025-06-06